# Patient Record
Sex: FEMALE | Race: WHITE | URBAN - METROPOLITAN AREA
[De-identification: names, ages, dates, MRNs, and addresses within clinical notes are randomized per-mention and may not be internally consistent; named-entity substitution may affect disease eponyms.]

---

## 2018-08-13 ENCOUNTER — LAB ENCOUNTER (OUTPATIENT)
Dept: LAB | Age: 43
End: 2018-08-13
Attending: DERMATOLOGY
Payer: COMMERCIAL

## 2018-08-13 DIAGNOSIS — L73.8 HOT TUB FOLLICULITIS: ICD-10-CM

## 2018-08-13 PROCEDURE — 87205 SMEAR GRAM STAIN: CPT

## 2018-08-13 PROCEDURE — 87077 CULTURE AEROBIC IDENTIFY: CPT

## 2018-08-13 PROCEDURE — 87070 CULTURE OTHR SPECIMN AEROBIC: CPT

## 2018-08-16 NOTE — PROGRESS NOTES
Please let Brett Aase know the culture didn't grow anything, but this is not unusual for hot tub folliculitis. CPM with medications already prescribed.   Is the patient improving already??

## 2018-08-16 NOTE — PROGRESS NOTES
Spoke with pt regarding results. I also asked pt how she was doing with the medications prescribed. Pt reports improvement, she is on her 3rd day of taking the medication.  Advised pt to contact office if condition changes or if she has any questions or con

## 2019-05-13 PROCEDURE — 88175 CYTOPATH C/V AUTO FLUID REDO: CPT | Performed by: INTERNAL MEDICINE

## 2019-05-13 PROCEDURE — 87624 HPV HI-RISK TYP POOLED RSLT: CPT | Performed by: INTERNAL MEDICINE

## 2019-10-29 PROBLEM — G47.33 OSA (OBSTRUCTIVE SLEEP APNEA): Status: ACTIVE | Noted: 2019-10-29
